# Patient Record
Sex: FEMALE | Race: WHITE | URBAN - METROPOLITAN AREA
[De-identification: names, ages, dates, MRNs, and addresses within clinical notes are randomized per-mention and may not be internally consistent; named-entity substitution may affect disease eponyms.]

---

## 2017-03-02 ENCOUNTER — RECORDS - HEALTHEAST (OUTPATIENT)
Dept: LAB | Facility: CLINIC | Age: 42
End: 2017-03-02

## 2017-03-02 ENCOUNTER — RECORDS - HEALTHEAST (OUTPATIENT)
Dept: ADMINISTRATIVE | Facility: OTHER | Age: 42
End: 2017-03-02

## 2017-03-02 LAB
CHOLEST SERPL-MCNC: 152 MG/DL
FASTING STATUS PATIENT QL REPORTED: NO
HDLC SERPL-MCNC: 30 MG/DL
LDLC SERPL CALC-MCNC: 98 MG/DL
TRIGL SERPL-MCNC: 119 MG/DL

## 2017-04-18 ENCOUNTER — RECORDS - HEALTHEAST (OUTPATIENT)
Dept: ADMINISTRATIVE | Facility: OTHER | Age: 42
End: 2017-04-18

## 2017-07-21 ENCOUNTER — RECORDS - HEALTHEAST (OUTPATIENT)
Dept: ADMINISTRATIVE | Facility: OTHER | Age: 42
End: 2017-07-21

## 2017-07-22 ENCOUNTER — OFFICE VISIT - HEALTHEAST (OUTPATIENT)
Dept: FAMILY MEDICINE | Facility: CLINIC | Age: 42
End: 2017-07-22

## 2017-07-22 DIAGNOSIS — J45.901 ASTHMA EXACERBATION: ICD-10-CM

## 2017-11-17 ENCOUNTER — RECORDS - HEALTHEAST (OUTPATIENT)
Dept: ADMINISTRATIVE | Facility: OTHER | Age: 42
End: 2017-11-17

## 2018-03-26 ENCOUNTER — AMBULATORY - HEALTHEAST (OUTPATIENT)
Dept: FAMILY MEDICINE | Facility: CLINIC | Age: 43
End: 2018-03-26

## 2018-03-26 DIAGNOSIS — E61.1 LOW IRON: ICD-10-CM

## 2018-03-26 LAB
BASOPHILS # BLD AUTO: 0.1 THOU/UL (ref 0–0.2)
BASOPHILS NFR BLD AUTO: 1 % (ref 0–2)
EOSINOPHIL # BLD AUTO: 0.6 THOU/UL (ref 0–0.4)
EOSINOPHIL NFR BLD AUTO: 5 % (ref 0–6)
ERYTHROCYTE [DISTWIDTH] IN BLOOD BY AUTOMATED COUNT: 19.3 % (ref 11–14.5)
HCT VFR BLD AUTO: 35.1 % (ref 35–47)
HGB BLD-MCNC: 10.1 G/DL (ref 12–16)
LYMPHOCYTES # BLD AUTO: 3.8 THOU/UL (ref 0.8–4.4)
LYMPHOCYTES NFR BLD AUTO: 35 % (ref 20–40)
MCH RBC QN AUTO: 19.4 PG (ref 27–34)
MCHC RBC AUTO-ENTMCNC: 28.8 G/DL (ref 32–36)
MCV RBC AUTO: 68 FL (ref 80–100)
MONOCYTES # BLD AUTO: 0.7 THOU/UL (ref 0–0.9)
MONOCYTES NFR BLD AUTO: 7 % (ref 2–10)
NEUTROPHILS # BLD AUTO: 5.6 THOU/UL (ref 2–7.7)
NEUTROPHILS NFR BLD AUTO: 52 % (ref 50–70)
PLATELET # BLD AUTO: 574 THOU/UL (ref 140–440)
PMV BLD AUTO: 9.3 FL (ref 8.5–12.5)
RBC # BLD AUTO: 5.2 MILL/UL (ref 3.8–5.4)
WBC: 10.9 THOU/UL (ref 4–11)

## 2018-05-03 ENCOUNTER — RECORDS - HEALTHEAST (OUTPATIENT)
Dept: ADMINISTRATIVE | Facility: OTHER | Age: 43
End: 2018-05-03

## 2018-05-03 ENCOUNTER — RECORDS - HEALTHEAST (OUTPATIENT)
Dept: LAB | Facility: CLINIC | Age: 43
End: 2018-05-03

## 2018-05-03 LAB
FERRITIN SERPL-MCNC: 4 NG/ML (ref 10–130)
IRON SATN MFR SERPL: 3 % (ref 20–50)
IRON SERPL-MCNC: 13 UG/DL (ref 42–175)
TIBC SERPL-MCNC: 491 UG/DL (ref 313–563)
TRANSFERRIN SERPL-MCNC: 393 MG/DL (ref 212–360)

## 2018-05-04 LAB
HPV SOURCE: ABNORMAL
HUMAN PAPILLOMA VIRUS 16 DNA: POSITIVE
HUMAN PAPILLOMA VIRUS 18 DNA: NEGATIVE
HUMAN PAPILLOMA VIRUS FINAL DIAGNOSIS: ABNORMAL
HUMAN PAPILLOMA VIRUS OTHER HR: POSITIVE
SPECIMEN DESCRIPTION: ABNORMAL

## 2018-05-15 LAB
BKR LAB AP ABNORMAL BLEEDING: NO
BKR LAB AP BIRTH CONTROL/HORMONES: ABNORMAL
BKR LAB AP CERVICAL APPEARANCE: NORMAL
BKR LAB AP GYN ADEQUACY: ABNORMAL
BKR LAB AP GYN INTERPRETATION: ABNORMAL
BKR LAB AP HPV REFLEX: ABNORMAL
BKR LAB AP LMP: ABNORMAL
BKR LAB AP PATIENT STATUS: ABNORMAL
BKR LAB AP PREVIOUS ABNORMAL: ABNORMAL
BKR LAB AP PREVIOUS NORMAL: ABNORMAL
HIGH RISK?: NO
PATH REPORT.COMMENTS IMP SPEC: ABNORMAL
RESULT FLAG (HE HISTORICAL CONVERSION): ABNORMAL

## 2018-05-18 ENCOUNTER — RECORDS - HEALTHEAST (OUTPATIENT)
Dept: ADMINISTRATIVE | Facility: OTHER | Age: 43
End: 2018-05-18

## 2018-05-21 ENCOUNTER — AMBULATORY - HEALTHEAST (OUTPATIENT)
Dept: ONCOLOGY | Facility: HOSPITAL | Age: 43
End: 2018-05-21

## 2018-06-26 ENCOUNTER — COMMUNICATION - HEALTHEAST (OUTPATIENT)
Dept: ADMINISTRATIVE | Facility: HOSPITAL | Age: 43
End: 2018-06-26

## 2018-06-28 ENCOUNTER — AMBULATORY - HEALTHEAST (OUTPATIENT)
Dept: INFUSION THERAPY | Facility: HOSPITAL | Age: 43
End: 2018-06-28

## 2018-06-28 ENCOUNTER — OFFICE VISIT - HEALTHEAST (OUTPATIENT)
Dept: ONCOLOGY | Facility: HOSPITAL | Age: 43
End: 2018-06-28

## 2018-06-28 DIAGNOSIS — D50.9 IRON DEFICIENCY ANEMIA: ICD-10-CM

## 2018-06-28 LAB — FOLATE SERPL-MCNC: 6.5 NG/ML

## 2018-07-05 LAB
GLIADIN IGA SER-ACNC: 0.8 U/ML
GLIADIN IGG SER-ACNC: <0.4 U/ML
IGA SERPL-MCNC: 259 MG/DL (ref 65–400)
TTG IGA SER-ACNC: 0.2 U/ML
TTG IGG SER-ACNC: <0.6 U/ML

## 2018-09-06 ENCOUNTER — COMMUNICATION - HEALTHEAST (OUTPATIENT)
Dept: ADMINISTRATIVE | Facility: HOSPITAL | Age: 43
End: 2018-09-06

## 2018-09-24 ENCOUNTER — COMMUNICATION - HEALTHEAST (OUTPATIENT)
Dept: ADMINISTRATIVE | Facility: HOSPITAL | Age: 43
End: 2018-09-24

## 2019-04-19 ENCOUNTER — RECORDS - HEALTHEAST (OUTPATIENT)
Dept: LAB | Facility: CLINIC | Age: 44
End: 2019-04-19

## 2019-04-19 LAB — IRON SERPL-MCNC: 12 UG/DL (ref 42–175)

## 2019-04-23 LAB
HPV SOURCE: NORMAL
HUMAN PAPILLOMA VIRUS 16 DNA: NEGATIVE
HUMAN PAPILLOMA VIRUS 18 DNA: NEGATIVE
HUMAN PAPILLOMA VIRUS FINAL DIAGNOSIS: NORMAL
HUMAN PAPILLOMA VIRUS OTHER HR: NEGATIVE
SPECIMEN DESCRIPTION: NORMAL

## 2019-04-29 LAB
BKR LAB AP ABNORMAL BLEEDING: NO
BKR LAB AP BIRTH CONTROL/HORMONES: NORMAL
BKR LAB AP CERVICAL APPEARANCE: NORMAL
BKR LAB AP GYN ADEQUACY: NORMAL
BKR LAB AP GYN INTERPRETATION: NORMAL
BKR LAB AP HPV REFLEX: NORMAL
BKR LAB AP LMP: NORMAL
BKR LAB AP PATIENT STATUS: NORMAL
BKR LAB AP PREVIOUS ABNORMAL: NORMAL
BKR LAB AP PREVIOUS NORMAL: NORMAL
HIGH RISK?: YES
PATH REPORT.COMMENTS IMP SPEC: NORMAL
RESULT FLAG (HE HISTORICAL CONVERSION): NORMAL

## 2020-03-01 ENCOUNTER — HEALTH MAINTENANCE LETTER (OUTPATIENT)
Age: 45
End: 2020-03-01

## 2020-12-13 ENCOUNTER — HEALTH MAINTENANCE LETTER (OUTPATIENT)
Age: 45
End: 2020-12-13

## 2021-04-17 ENCOUNTER — HEALTH MAINTENANCE LETTER (OUTPATIENT)
Age: 46
End: 2021-04-17

## 2021-05-25 ENCOUNTER — RECORDS - HEALTHEAST (OUTPATIENT)
Dept: ADMINISTRATIVE | Facility: CLINIC | Age: 46
End: 2021-05-25

## 2021-05-31 VITALS — BODY MASS INDEX: 25.39 KG/M2 | WEIGHT: 157.3 LBS

## 2021-06-01 VITALS — WEIGHT: 165.2 LBS | BODY MASS INDEX: 26.66 KG/M2

## 2021-06-12 NOTE — PROGRESS NOTES
Assessment:     1. Asthma exacerbation  predniSONE (DELTASONE) 10 mg tablet    albuterol nebulizer solution 3 mL (PROVENTIL)          Plan:     We will treat patient's asthma exacerbation with a burst and taper of prednisone.  Continue with Qvar at home as well as Singulair that was started yesterday.  She may continue to use her albuterol nebulizer or inhaler as needed for shortness of breath and follow-up with her primary care physician if she is getting worse or not improving.  Patient is agreeable with this plan.    Subjective:       41 y.o. female with asthma presents for evaluation of a 2 day history of increased shortness of breath and wheezing.  Tends to get a flareup when it is particularly humid outside.  She was seen [facility] where they gave her a spacer for her albuterol inhaler as well as a prescription for Singulair.  She uses Qvar on a daily basis as well.  Does not feel like she is gotten any better and has had 2 albuterol nebulizer treatments today without significant relief.  She has not had any fevers, nasal congestion.  Her cough is very tight and has not been productive.    The following portions of the patient's history were reviewed and updated as appropriate: allergies, current medications, past family history, past medical history, past social history, past surgical history and problem list.    Review of Systems  A 12 point comprehensive review of systems was negative except as noted.     Objective:        Vitals:    07/22/17 1237   BP: 112/72   Pulse: 88   Temp: 98.2  F (36.8  C)   SpO2: 99%     General Appearance:    Alert, pleasant, cooperative, mildly short of breath.  Tight sounding cough noted.     Head:    Normocephalic, without obvious abnormality, atraumatic   Eyes:    Conjunctiva/corneas clear   Ears:    Normal TM's without erythema or bulging. Opal external ear canals, both ears   Nose:   Nares normal, septum midline, mucosa normal, no  drainage    or sinus tenderness   Throat:   Lips, mucosa, and tongue normal; teeth and gums normal.  No tonsilar hypertrophy or exudate.   Neck:    Cardiovascular:   Supple, symmetrical, trachea midline, no adenopathy;     thyroid:  no enlargement/tenderness/nodules  Regular rate and rhythm, no murmurs, rubs, or gallops.   Lungs:    decreased air movement noted bilaterally.  There are some scattered faint expiratory wheezes heard throughout her lung fields.  No rhonchi heard.  Albuterol nebulizer given and patient's lungs reexamined following the treatment.  Significant improvement of her aeration still with some scattered expiratory wheezing heard.  Symptomatically proved breathing per patient.                            This note has been dictated using voice recognition software. Any grammatical or context distortions are unintentional and inherent to the software

## 2021-06-18 NOTE — PROGRESS NOTES
In Basket message received for Hematology Consult, referring physician is  Sarah JONES from [facility]. Call placed to Cate to set up appointment. Same scheduled for Thursday June 28th, 2018 at 9:00 am with Dr. Rocha. With a nurse apt at  8:30 am. E mail sent to Cate with informational letter, MD bio card, [facility] intake form, medication and allergy list, and appointment letter. Work up for Anemia has been done at  [facility] in [place]. Cate states she did have iron infusions in the past at [facility], these records will need to be pulled into her chart.   Medical records will collect any outside records.      I explained that Cate would becoming to a cancer center and that the doctors are both cancer and blood doctors. Explained the appointment process of arriving early and bringing his Health History and medication allergy list along to his appointment.     Given [facility] Number to call if Cate has further questions or concerns. [phone]

## 2021-06-18 NOTE — CONSULTS
[facility] Hematology and Oncology Consult Note    Patient: Cate Patel  MRN: [id number]  Date of Service: 06/28/2018        Reason for Visit    I was consulted by Dr. Gtz regarding iron deficiency anemia    Assessment/Plan    Iron deficiency anemia possibly secondary to malabsorption    Patient with mild anemia and workup confirming iron deficiency with low ferritin and low iron saturation.  She is not very symptomatic at this time.  She is unable to tolerate oral iron and is not interested in a trial of oral iron at this time.  She has tried to modify her diet to increase iron rich foods but there has not been a significant response to this.    Lab work is reviewed and there is no real concern for any other issues that are contributing to her anemia.  She had hemoglobin electrophoresis done with a questionable history of alpha thalassemia trait but there is no family history to support this.    At this point I would do additional lab work to rule out celiac sprue.  She is agreeable to iron infusion with Venofer and we will schedule this for the next 2 weeks.  Will have her return in 2-3 months to recheck CBC, ferritin and iron saturation to assess response to the IV Venofer.  Patient's questions are answered and she understands and is agreeable to the plan.    Plan: Check folic acid and tests for celiac sprue today  Return in 2 weeks to start Venofer 200 mg ×5 doses  Follow-up in 2-3 months with recheck of CBC, ferritin and iron saturation          ECOG Performance   ECOG Performance Status: 0  Distress Assessment  Distress Assessment Score: 3        Problem List    1. Iron deficiency anemia  Folate, Serum    Celiac(Gluten)Antibody Panel ($$$)    HM1(CBC and Differential)    Ferritin    Iron and Transferrin Iron Binding Capacity    Folate, Serum           CC: Wendy Gtz MD      ______________________________________________________________________________      Staging History    No matching staging  information was found for the patient.    History    Ms. Cate Patel is a 42-year-old with past history of asthma and iron deficiency anemia who was referred for further evaluation.  She had iron deficiency anemia with her first pregnancy in  but did not require any treatment.  She was anemic again with her second pregnancy in  and tried oral iron but had significant nausea and could not tolerate this.  In  she was found to be profoundly anemic with a hemoglobin of 6.6 and received iron infusions with improvement.  She had recent follow-up with blood work showing mild anemia but persistent iron deficiency and so she is referred to us for further evaluation.  She is currently having no periods as she has been on oral contraception for at least the last 2 years to manage painful and heavy periods.  She denies any GI or genitourinary bleeding.  She reports a regular diet and has tried to increase iron in the diet without improvement in her iron deficiency.  She has had  but no other abdominal surgeries.  Overall she feels well except for her asthma symptoms.    No blood transfusion previously.  Previous  and wisdom teeth extraction.  No other hospitalizations.    She is single and works as a [employer] and has 2 kids.  Does not smoke or drink.  Mother had iron deficiency.  No other family history of anemia.  No other personal or family history of cancer.        Past History  Past Medical History:   Diagnosis Date     Anemia ongoing for many years     Asthma      DJD (degenerative joint disease) of lumbar spine      Past Surgical History:   Procedure Laterality Date      SECTION  10/4/2001     WISDOM TOOTH EXTRACTION      as a teenager     History reviewed. No pertinent family history.  Social History     Social History     Marital status: Single     Spouse name: N/A     Number of children: 2     Years of education: N/A     Occupational History     student      Social History Main  "Topics     Smoking status: Never Smoker     Smokeless tobacco: Never Used     Alcohol use No     Drug use: No     Sexual activity: No     Other Topics Concern     None     Social History Narrative     Allergies    Allergies   Allergen Reactions     Penicillins Rash     Pt states \"I think it's penicillin, but I'm not sure, I know it was some kind of antibiotic\"       Review of Systems    12 point review of systems completed          Pain  Currently in Pain: Yes  Pain Score (Initial OR Reassessment): 3  Pain Frequency: Constant/continuous  Location: lower back  Pain Characteristics : Aching  Pain Intervention(s): Heat applied;Cold applied  Response to Interventions: helpful    Physical Exam    Recent Vitals 6/28/2018   Height -   Weight 165 lbs 3 oz   BSA (m2) -   /88   Pulse 92   Temp 98.4   Temp src 1   SpO2 96   Some recent data might be hidden       GENERAL: Alert and oriented. Seated comfortably. In no distress.    HEAD: Atraumatic and normocephalic.  Has a full head of hair.    EYES: RITA, EOMI.  No pallor.  No icterus.    Oral cavity: no mucosal lesion or tonsillar enlargement.    NECK: supple. JVP normal.  No thyroid enlargement.    LYMPH NODES: No palpable, cervical, axillary or inguinal lymphadenopathy.    CHEST: clear to auscultation bilaterally.  Resonant to percussion throughout bilaterally.  Symmetrical breath movements bilaterally.    CVS: S1 and S2 are heard. Regular rate and rhythm.  No murmur or gallop or rub heard.    ABDOMEN: Soft. Not tender. Not distended.  No palpable hepatomegaly or splenomegaly.  No other mass palpable.  Bowel sounds heard.    EXTREMITIES: Warm.  No peripheral edema.    SKIN: no rash, or bruising or purpura.    CNS: Nonfocal      Lab Results    CBC white count 8.3 hemoglobin 10 platelets 500 MCV 64.  Iron saturation 3% and ferritin 4.  Hemoglobin has been low since 2015.  Imaging Results    No results found.      Signed by: Harley Rocha MD  "

## 2021-09-26 ENCOUNTER — HEALTH MAINTENANCE LETTER (OUTPATIENT)
Age: 46
End: 2021-09-26

## 2022-05-08 ENCOUNTER — HEALTH MAINTENANCE LETTER (OUTPATIENT)
Age: 47
End: 2022-05-08

## 2023-01-14 ENCOUNTER — HEALTH MAINTENANCE LETTER (OUTPATIENT)
Age: 48
End: 2023-01-14

## 2023-06-02 ENCOUNTER — HEALTH MAINTENANCE LETTER (OUTPATIENT)
Age: 48
End: 2023-06-02

## 2024-03-08 ENCOUNTER — TRANSFERRED RECORDS (OUTPATIENT)
Dept: HEALTH INFORMATION MANAGEMENT | Facility: CLINIC | Age: 49
End: 2024-03-08

## 2024-03-18 ENCOUNTER — TRANSFERRED RECORDS (OUTPATIENT)
Dept: HEALTH INFORMATION MANAGEMENT | Facility: CLINIC | Age: 49
End: 2024-03-18

## 2024-04-08 ENCOUNTER — TRANSFERRED RECORDS (OUTPATIENT)
Dept: HEALTH INFORMATION MANAGEMENT | Facility: CLINIC | Age: 49
End: 2024-04-08

## 2024-04-09 ENCOUNTER — TRANSFERRED RECORDS (OUTPATIENT)
Dept: HEALTH INFORMATION MANAGEMENT | Facility: CLINIC | Age: 49
End: 2024-04-09

## 2024-04-09 LAB
CREATININE (EXTERNAL): 0.67 MG/DL (ref 0.57–1)
GFR ESTIMATED (EXTERNAL): 109 ML/MIN/1.73

## 2024-07-09 ENCOUNTER — TRANSFERRED RECORDS (OUTPATIENT)
Dept: HEALTH INFORMATION MANAGEMENT | Facility: CLINIC | Age: 49
End: 2024-07-09

## 2024-07-29 ENCOUNTER — TRANSFERRED RECORDS (OUTPATIENT)
Dept: HEALTH INFORMATION MANAGEMENT | Facility: CLINIC | Age: 49
End: 2024-07-29